# Patient Record
Sex: MALE | Race: ASIAN | Employment: OTHER | URBAN - METROPOLITAN AREA
[De-identification: names, ages, dates, MRNs, and addresses within clinical notes are randomized per-mention and may not be internally consistent; named-entity substitution may affect disease eponyms.]

---

## 2017-06-13 ENCOUNTER — TELEPHONE (OUTPATIENT)
Dept: INTERNAL MEDICINE CLINIC | Facility: CLINIC | Age: 47
End: 2017-06-13

## 2017-06-13 NOTE — TELEPHONE ENCOUNTER
Pt has appt for tomorrow for follow up but stated he has new symptoms of heart palpitations. Felt last night and about week before. Heart rate felt faster.  Pressed on chest wall to try to calm heart, then felt chest pain, did not have chest pain before doi

## 2017-06-13 NOTE — TELEPHONE ENCOUNTER
Noted below. Okay for visit tomorrow unless symptoms worsen or he has chest pain or shortness of breath. In that case go to the ER.

## 2017-06-14 ENCOUNTER — OFFICE VISIT (OUTPATIENT)
Dept: INTERNAL MEDICINE CLINIC | Facility: CLINIC | Age: 47
End: 2017-06-14

## 2017-06-14 VITALS
HEART RATE: 76 BPM | TEMPERATURE: 98 F | HEIGHT: 65 IN | BODY MASS INDEX: 29.09 KG/M2 | RESPIRATION RATE: 12 BRPM | SYSTOLIC BLOOD PRESSURE: 116 MMHG | DIASTOLIC BLOOD PRESSURE: 70 MMHG | WEIGHT: 174.63 LBS

## 2017-06-14 DIAGNOSIS — R00.2 PALPITATIONS: Primary | ICD-10-CM

## 2017-06-14 PROCEDURE — 99214 OFFICE O/P EST MOD 30 MIN: CPT | Performed by: INTERNAL MEDICINE

## 2017-06-14 PROCEDURE — 93000 ELECTROCARDIOGRAM COMPLETE: CPT | Performed by: INTERNAL MEDICINE

## 2017-06-14 NOTE — PROGRESS NOTES
[facility]    CHIEF COMPLAINT:  Patient presents with:  Palpitations        HISTORY OF PRESENT ILLNESS:  Complains of palpitations off and on for a month. Feels like his heart is racing or is irregular and skipping a beat.  Usually occurs during t <4.97   Non HDL Chol 152 (H) <130 mg/dL   -HEMOGLOBIN A1C   Result Value Ref Range   HgbA1C 5.6 <5.7 %   Estimated Average Glucose 114  mg/dL   -COMP METABOLIC PANEL (14)   Result Value Ref Range   Glucose 97 70-99 mg/dL   BUN 10 8-20 mg/dL   Creatin

## 2017-06-16 DIAGNOSIS — R79.89 LOW TSH LEVEL: Primary | ICD-10-CM

## 2017-08-02 ENCOUNTER — TELEPHONE (OUTPATIENT)
Dept: INTERNAL MEDICINE CLINIC | Facility: CLINIC | Age: 47
End: 2017-08-02

## 2017-08-02 NOTE — TELEPHONE ENCOUNTER
Called [facility], spoke to Plons. All providers are booking into October. He can see Dr Lawyer Granado in [place] in August.  Pt aware. Pt to call to reschedule.

## 2017-08-02 NOTE — TELEPHONE ENCOUNTER
Spoke with pt. States that his appt with Dr. Hawk Graves is in October and OV with VICKEY Prescott is 9/18/17. Pt asking if it is OK to wait or if Dr. Derrick Rivas can recommend other Cardio and Endocrine. Please advise.

## 2017-08-02 NOTE — TELEPHONE ENCOUNTER
Patient cannot get in to Dr. Noemi Kim till Oct. And can't get into Dr. Raza Montalvo till 9/18. Is it ok to wait, or is there another recommendation for him? Also patient is requesting that we fax his most recent labs to him [phone].

## 2017-08-10 PROCEDURE — 83520 IMMUNOASSAY QUANT NOS NONAB: CPT | Performed by: INTERNAL MEDICINE

## 2017-08-10 PROCEDURE — 84445 ASSAY OF TSI GLOBULIN: CPT | Performed by: INTERNAL MEDICINE

## 2017-08-10 PROCEDURE — 84480 ASSAY TRIIODOTHYRONINE (T3): CPT | Performed by: INTERNAL MEDICINE

## 2017-10-09 ENCOUNTER — TELEPHONE (OUTPATIENT)
Dept: INTERNAL MEDICINE CLINIC | Facility: CLINIC | Age: 47
End: 2017-10-09

## 2017-10-09 NOTE — TELEPHONE ENCOUNTER
Pt called requesting orders for Card Echo 2D Doppler and Card Monitor Holter 48 Hour.  Faxed to pt private fax #[phone]

## 2017-10-09 NOTE — TELEPHONE ENCOUNTER
[facility] dated [facility] to present. Forwarded to Scan Stat 10-10-17.   See med rec scan dated 10-9-17

## 2017-10-09 NOTE — TELEPHONE ENCOUNTER
Incoming (mail or fax):  fax  Received from:  [id number],[address] [place] [address]  Documentation given to:  Sebastian Alanis

## 2017-10-10 ENCOUNTER — TELEPHONE (OUTPATIENT)
Dept: INTERNAL MEDICINE CLINIC | Facility: CLINIC | Age: 47
End: 2017-10-10

## 2017-10-10 NOTE — TELEPHONE ENCOUNTER
If he is still having the palpitations my recommendation would be to have the holter done as that is really the only way to evaluate this. I am not aware of any locations though where it is offered at a cheaper price. Sorry.

## 2017-10-10 NOTE — TELEPHONE ENCOUNTER
Spoke with pt, he is feeling same on metoprolol, no noticeable changes to his palpitations. He is scheduled later this week for Thyroid Uptake and Scan and is having 2d echo today.  Pt asking if we know of location that does holter for less or if absolutely

## 2017-10-10 NOTE — TELEPHONE ENCOUNTER
Spoke with pt, advised since still having palpitations, still recommended he have the holter monitor done to evaluate this, but he would need to call around for pricing. Pt stated understanding.

## 2017-10-10 NOTE — TELEPHONE ENCOUNTER
Patient called saying that Dr. Jacob De León ordered some testing done for his heart. Patient stated that he was ordered a ECHO 2D and halter monitor. Patient was told by [facility] that these tests were not covered by his insurance.  Patient said that he fou

## 2017-10-10 NOTE — TELEPHONE ENCOUNTER
He is now on metoprolol per endocrine for his palpitations. How is he feeling on this? Are his palpitations better or does he continue to have these?

## 2017-10-10 NOTE — TELEPHONE ENCOUNTER
See below, do you have recommendation for a place that will do holter for less money? He wants to know how necessary tests are as they are not covered by insurance.

## 2017-10-25 NOTE — TELEPHONE ENCOUNTER
Incoming (mail or fax):  fax  Received from:  Kj Hernandez. request  Documentation given to:  [employer] records bin

## 2017-11-22 ENCOUNTER — TELEPHONE (OUTPATIENT)
Dept: INTERNAL MEDICINE CLINIC | Facility: CLINIC | Age: 47
End: 2017-11-22

## 2017-11-22 NOTE — TELEPHONE ENCOUNTER
Incoming (mail or fax): Fax  Received from:  Patient delivered in office. Documentation given to:  Triage- in Dr. Vera Harrington test results folder.

## 2017-11-22 NOTE — TELEPHONE ENCOUNTER
Pt dropped off faxed documents from [facility] re: thyroid uptake test and US of thyroid. Documents from [address] [place] [address] included @d doppler and 24 hour monitor results. To consult folder.

## 2017-11-22 NOTE — TELEPHONE ENCOUNTER
Reviewed results. Thyroid uptake and thyroid us ordered by endocrine. Echo was normal.   holter showed essentially sinus rhythm but there was one run of 9 beats where the rhythm was irregular. Unsure what this rhythm is.    Would recommend he see cardio

## 2017-11-28 NOTE — TELEPHONE ENCOUNTER
Patient made an appt with Dr Lorene Morley for December 13. He was not able to get an appt with Dr Jes Kennedy soon. Is this ok?

## 2017-12-08 PROBLEM — R00.2 PALPITATION: Status: ACTIVE | Noted: 2017-12-08

## 2018-08-20 ENCOUNTER — OFFICE VISIT (OUTPATIENT)
Dept: INTERNAL MEDICINE CLINIC | Facility: CLINIC | Age: 48
End: 2018-08-20
Payer: COMMERCIAL

## 2018-08-20 VITALS
HEART RATE: 72 BPM | DIASTOLIC BLOOD PRESSURE: 60 MMHG | SYSTOLIC BLOOD PRESSURE: 110 MMHG | TEMPERATURE: 98 F | WEIGHT: 173.19 LBS | RESPIRATION RATE: 12 BRPM | HEIGHT: 65 IN | BODY MASS INDEX: 28.86 KG/M2

## 2018-08-20 DIAGNOSIS — R00.2 PALPITATION: ICD-10-CM

## 2018-08-20 DIAGNOSIS — Z12.5 SCREENING FOR PROSTATE CANCER: ICD-10-CM

## 2018-08-20 DIAGNOSIS — N28.1 BILATERAL RENAL CYSTS: ICD-10-CM

## 2018-08-20 DIAGNOSIS — Z00.00 PHYSICAL EXAM, ANNUAL: Primary | ICD-10-CM

## 2018-08-20 PROCEDURE — 99396 PREV VISIT EST AGE 40-64: CPT | Performed by: INTERNAL MEDICINE

## 2018-08-20 NOTE — PROGRESS NOTES
[facility]    CHIEF COMPLAINT: Patient presents with:  Routine Physical         HPI:   Jhoana Peterson is a 49year old male who presents for a complete physical exam.      Palpitations:  Seeing cardiology. Is on diltiazem er.  He still gets occasio transplant  2/15: OTHER SURGICAL HISTORY      Comment: lithotripsy  12/15: OTHER SURGICAL HISTORY      Comment: sinus surgery  10/15/15: UPPER GI ENDOSCOPY PERFORMED      Comment: antral gastritis  2011: VASECTOMY   Family History   Problem Relation Age of apparent distress  SKIN: no rashes,no suspicious lesions  HEENT: oropharynx with cobblestoning and post nasal drip. No erythema.    EYES:PERRLA, conjunctiva are clear  NECK: supple,no adenopathy,  CHEST: no chest tenderness  LUNGS: clear to auscultation  CA PANEL (14)  - LIPID PANEL    2. Bilateral renal cysts  Will check ultrasound. - US KIDNEYS ([id number]=[id number]); Future    3. Screening for prostate cancer  - PSA    4. Palpitation  follow up with cardiology as planned.      The patient is asked to return in 1 yea

## 2018-08-25 LAB
ABSOLUTE BASOPHILS: 41 CELLS/UL (ref 0–200)
ABSOLUTE EOSINOPHILS: 78 CELLS/UL (ref 15–500)
ABSOLUTE LYMPHOCYTES: 1219 CELLS/UL (ref 850–3900)
ABSOLUTE MONOCYTES: 396 CELLS/UL (ref 200–950)
ABSOLUTE NEUTROPHILS: 2866 CELLS/UL (ref 1500–7800)
ALBUMIN/GLOBULIN RATIO: 1.9 (CALC) (ref 1–2.5)
ALBUMIN: 4.5 G/DL (ref 3.6–5.1)
ALKALINE PHOSPHATASE: 77 U/L (ref 40–115)
ALT: 35 U/L (ref 9–46)
AST: 29 U/L (ref 10–40)
BASOPHILS: 0.9 %
BILIRUBIN, TOTAL: 1 MG/DL (ref 0.2–1.2)
BUN: 12 MG/DL (ref 7–25)
CALCIUM: 9.8 MG/DL (ref 8.6–10.3)
CARBON DIOXIDE: 28 MMOL/L (ref 20–32)
CHLORIDE: 103 MMOL/L (ref 98–110)
CHOL/HDLC RATIO: 4.2 (CALC)
CHOLESTEROL, TOTAL: 186 MG/DL
CREATININE: 0.96 MG/DL (ref 0.6–1.35)
EGFR IF AFRICN AM: 108 ML/MIN/1.73M2
EGFR IF NONAFRICN AM: 93 ML/MIN/1.73M2
EOSINOPHILS: 1.7 %
GLOBULIN: 2.4 G/DL (CALC) (ref 1.9–3.7)
GLUCOSE: 93 MG/DL (ref 65–99)
HDL CHOLESTEROL: 44 MG/DL
HEMATOCRIT: 41.8 % (ref 38.5–50)
HEMOGLOBIN: 14.5 G/DL (ref 13.2–17.1)
LDL-CHOLESTEROL: 123 MG/DL (CALC)
LYMPHOCYTES: 26.5 %
MCH: 30.9 PG (ref 27–33)
MCHC: 34.7 G/DL (ref 32–36)
MCV: 88.9 FL (ref 80–100)
MONOCYTES: 8.6 %
MPV: 11 FL (ref 7.5–12.5)
NEUTROPHILS: 62.3 %
NON-HDL CHOLESTEROL: 142 MG/DL (CALC)
PLATELET COUNT: 173 THOUSAND/UL (ref 140–400)
POTASSIUM: 4.2 MMOL/L (ref 3.5–5.3)
PROTEIN, TOTAL: 6.9 G/DL (ref 6.1–8.1)
PSA, TOTAL: 0.5 NG/ML
RDW: 12.8 % (ref 11–15)
RED BLOOD CELL COUNT: 4.7 MILLION/UL (ref 4.2–5.8)
SODIUM: 138 MMOL/L (ref 135–146)
TRIGLYCERIDES: 89 MG/DL
WHITE BLOOD CELL COUNT: 4.6 THOUSAND/UL (ref 3.8–10.8)

## 2018-08-31 ENCOUNTER — TELEPHONE (OUTPATIENT)
Dept: INTERNAL MEDICINE CLINIC | Facility: CLINIC | Age: 48
End: 2018-08-31

## 2018-08-31 NOTE — TELEPHONE ENCOUNTER
Incoming (mail or fax):  fax  Received from:  [facility]  Documentation given to:  triage

## 2018-08-31 NOTE — TELEPHONE ENCOUNTER
Called the patient and spoke with his wife, Meme Bull, who is on HIPAA. Informed her that the [address] of his kidneys showed only a small right renal cyst, so Dr. Dilcia Lino recommends he stay plenty hydrated and drink a lot of water.  The patient's wife verbalized unde

## 2018-08-31 NOTE — TELEPHONE ENCOUNTER
Ultrasound shows only a small right renal cyst. Maybe a small non obstructing kidney stone. Stay well hydrated.

## 2019-11-07 ENCOUNTER — OFFICE VISIT (OUTPATIENT)
Dept: INTERNAL MEDICINE CLINIC | Facility: CLINIC | Age: 49
End: 2019-11-07
Payer: COMMERCIAL

## 2019-11-07 VITALS
TEMPERATURE: 98 F | RESPIRATION RATE: 12 BRPM | BODY MASS INDEX: 29.72 KG/M2 | HEIGHT: 65 IN | WEIGHT: 178.38 LBS | SYSTOLIC BLOOD PRESSURE: 120 MMHG | DIASTOLIC BLOOD PRESSURE: 78 MMHG | HEART RATE: 72 BPM

## 2019-11-07 DIAGNOSIS — Z12.5 SCREENING FOR PROSTATE CANCER: ICD-10-CM

## 2019-11-07 DIAGNOSIS — Z00.00 PHYSICAL EXAM, ANNUAL: Primary | ICD-10-CM

## 2019-11-07 DIAGNOSIS — Z12.11 SCREENING FOR COLON CANCER: ICD-10-CM

## 2019-11-07 PROCEDURE — 99396 PREV VISIT EST AGE 40-64: CPT | Performed by: INTERNAL MEDICINE

## 2019-11-07 NOTE — PROGRESS NOTES
[facility]    CHIEF COMPLAINT: Patient presents with:  Routine Physical  Imm/Inj: declines flu shot.  Will have done at [facility].          HPI:   Dallas Collier is a 47year old male who presents for a complete physical exam.     Seeing cardiology fo Family History   Problem Relation Age of Onset   • Other (MI,) Paternal Grandmother         69's   • Other (CAD, s/p MI) Paternal Grandfather    • Other (HTN,anemia) Maternal Grandmother    • Other (knee pain) Father    • Other (HTN,HLD,DM) Mother    • O tenderness  GENITAL/URINARY:  normal testicular exam, no inguinal hernia present, prostate with no masses or tenderness  MUSCULOSKELETAL: back is not tender,FROM of the back  EXTREMITIES: no cyanosis, clubbing or edema  NEURO: Oriented times three,cranial 11/7/2020) for physical.      Khalida Dewey MD

## 2019-11-11 DIAGNOSIS — R79.89 LOW TSH LEVEL: Primary | ICD-10-CM

## 2019-11-14 NOTE — PROGRESS NOTES
Spoke to pt. Stated had Thyroid US done 2 years ago. Noted scan 11/10/17 in Media tab for Thyroid Ultrasound by Dr. Taylor Orr, endocrinologist.  Noted used to be on Methimazole 5mg daily.   To Dr. Nicole Carpenter for review, see 11/10/17 scan & Cicely's note bel

## 2019-11-15 DIAGNOSIS — E05.90 HYPERTHYROIDISM: Primary | ICD-10-CM

## 2020-04-29 ENCOUNTER — TELEPHONE (OUTPATIENT)
Dept: INTERNAL MEDICINE CLINIC | Facility: CLINIC | Age: 50
End: 2020-04-29

## 2020-05-19 ENCOUNTER — TELEPHONE (OUTPATIENT)
Dept: INTERNAL MEDICINE CLINIC | Facility: CLINIC | Age: 50
End: 2020-05-19

## 2020-05-19 NOTE — TELEPHONE ENCOUNTER
Incoming (mail or fax): fax  Received from:  [facility]  Documentation given to:  triage

## 2020-05-20 RX ORDER — ATORVASTATIN CALCIUM 20 MG/1
20 TABLET, FILM COATED ORAL DAILY
COMMUNITY
Start: 2020-04-30

## 2020-05-26 NOTE — TELEPHONE ENCOUNTER
1 polyp 5-6mm. Path report shows tubular adenoma. Per uptodate pt needs repeat colonoscopy in 7-10 years.

## 2020-06-24 ENCOUNTER — MED REC SCAN ONLY (OUTPATIENT)
Dept: INTERNAL MEDICINE CLINIC | Facility: CLINIC | Age: 50
End: 2020-06-24

## 2020-07-29 LAB — AMB EXT COVID-19 RESULT: DETECTED

## 2020-08-04 ENCOUNTER — TELEPHONE (OUTPATIENT)
Dept: INTERNAL MEDICINE CLINIC | Facility: CLINIC | Age: 50
End: 2020-08-04

## 2020-08-04 NOTE — TELEPHONE ENCOUNTER
Pt tested positive for COVID last week and his family member is a doctor and recommended he get a CXR. Does he need a virtual visit?

## 2020-08-04 NOTE — TELEPHONE ENCOUNTER
Pt tested Positive for COVID last week and Pt would like to get a chest Xray ordered.   Please advise  Thank you

## 2020-08-05 ENCOUNTER — HOSPITAL ENCOUNTER (OUTPATIENT)
Dept: GENERAL RADIOLOGY | Facility: HOSPITAL | Age: 50
Discharge: HOME | End: 2020-08-05
Attending: INTERNAL MEDICINE
Payer: COMMERCIAL

## 2020-08-05 ENCOUNTER — TELEMEDICINE (OUTPATIENT)
Dept: INTERNAL MEDICINE CLINIC | Facility: CLINIC | Age: 50
End: 2020-08-05

## 2020-08-05 ENCOUNTER — TELEPHONE (OUTPATIENT)
Dept: INTERNAL MEDICINE CLINIC | Facility: CLINIC | Age: 50
End: 2020-08-05

## 2020-08-05 DIAGNOSIS — R06.02 SHORTNESS OF BREATH: ICD-10-CM

## 2020-08-05 DIAGNOSIS — U07.1 REAL TIME REVERSE TRANSCRIPTASE PCR POSITIVE FOR COVID-19 VIRUS: ICD-10-CM

## 2020-08-05 DIAGNOSIS — J18.9 PNEUMONIA OF RIGHT UPPER LOBE DUE TO INFECTIOUS ORGANISM: Primary | ICD-10-CM

## 2020-08-05 DIAGNOSIS — U07.1 REAL TIME REVERSE TRANSCRIPTASE PCR POSITIVE FOR COVID-19 VIRUS: Primary | ICD-10-CM

## 2020-08-05 PROCEDURE — 71046 X-RAY EXAM CHEST 2 VIEWS: CPT | Performed by: INTERNAL MEDICINE

## 2020-08-05 PROCEDURE — 99213 OFFICE O/P EST LOW 20 MIN: CPT | Performed by: INTERNAL MEDICINE

## 2020-08-05 RX ORDER — AZITHROMYCIN 250 MG/1
TABLET, FILM COATED ORAL
Qty: 6 TABLET | Refills: 0 | Status: SHIPPED | OUTPATIENT
Start: 2020-08-05 | End: 2020-08-10

## 2020-08-05 RX ORDER — AMOXICILLIN AND CLAVULANATE POTASSIUM 875; 125 MG/1; MG/1
1 TABLET, FILM COATED ORAL 2 TIMES DAILY
Qty: 20 TABLET | Refills: 0 | Status: SHIPPED | OUTPATIENT
Start: 2020-08-05 | End: 2020-11-13

## 2020-08-05 NOTE — TELEPHONE ENCOUNTER
Virtual/Telephone Consent    Toshia Cerda verbally consents to a Virtual/Telephone Check-In service on 8/5/2020. Patient understands and accepts financial responsibility for any deductible, co-insurance and/or co-pays associated with this service.     Swapna

## 2020-08-05 NOTE — PROGRESS NOTES
Doximity Video Visit Consent    Florecita Cedeno verbally consents to a Doximity Video Visit Check-In service on 8/5/2020. Patient understands that we are using a different platform that may not be as secure as our traditional telehealth platform.  Patient wa your physician. (Patient not taking: Reported on 1/2/2020 ) 1 Bottle 0   • Multiple Vitamins-Minerals (CENTRUM OR) Take by mouth daily.          PAST MEDICAL, SOCIAL, AND FAMILY HISTORY:  Tobacco:      Smoking status: Former Smoker 0.00 Packs/day       PHYS this visit as no physical exam could be performed. Every conscious effort was taken to allow for sufficient and adequate time. This billing was spent on reviewing labs, medications, radiology tests and decision making.   Appropriate medical decision-alinein

## 2020-08-06 NOTE — TELEPHONE ENCOUNTER
The patient was called to check on his condition. The patient states that he is doing better and today he has no symptoms. He feels like normal today. He is taking the abt as prescribed.  He was instructed to continue to monitor his symptoms and go to ER fo

## 2020-08-07 ENCOUNTER — TELEPHONE (OUTPATIENT)
Dept: INTERNAL MEDICINE CLINIC | Facility: CLINIC | Age: 50
End: 2020-08-07

## 2020-08-07 NOTE — TELEPHONE ENCOUNTER
The patient was called to check on his condition. He states he is doing okay. Yesterday he felt good. Today he also felt good until a little while ago he felt mildly sob after sitting in his office for a couple of hours.  It resolved after getting up and mo

## 2020-08-10 ENCOUNTER — TELEPHONE (OUTPATIENT)
Dept: INTERNAL MEDICINE CLINIC | Facility: CLINIC | Age: 50
End: 2020-08-10

## 2020-08-10 NOTE — TELEPHONE ENCOUNTER
The patient states he is feeling better. He has not had symptoms at all in the last 3 days. He denies shortness of breath. He completed the zpack and is still taking the augmentin. He was tested positive for COVID on 7/29.  It has been 12 days, He was infor

## 2020-08-18 ENCOUNTER — TELEPHONE (OUTPATIENT)
Dept: INTERNAL MEDICINE CLINIC | Facility: CLINIC | Age: 50
End: 2020-08-18

## 2020-08-18 DIAGNOSIS — R93.89 ABNORMAL CHEST X-RAY: Primary | ICD-10-CM

## 2020-09-02 ENCOUNTER — HOSPITAL ENCOUNTER (OUTPATIENT)
Dept: GENERAL RADIOLOGY | Facility: HOSPITAL | Age: 50
Discharge: HOME | End: 2020-09-02
Attending: INTERNAL MEDICINE
Payer: COMMERCIAL

## 2020-09-02 DIAGNOSIS — R93.89 ABNORMAL CHEST X-RAY: ICD-10-CM

## 2020-09-02 PROCEDURE — 71046 X-RAY EXAM CHEST 2 VIEWS: CPT | Performed by: INTERNAL MEDICINE

## 2020-11-13 ENCOUNTER — OFFICE VISIT (OUTPATIENT)
Dept: INTERNAL MEDICINE CLINIC | Facility: CLINIC | Age: 50
End: 2020-11-13
Payer: COMMERCIAL

## 2020-11-13 VITALS
SYSTOLIC BLOOD PRESSURE: 112 MMHG | RESPIRATION RATE: 12 BRPM | TEMPERATURE: 97 F | BODY MASS INDEX: 28.27 KG/M2 | WEIGHT: 169.69 LBS | HEIGHT: 65 IN | HEART RATE: 64 BPM | DIASTOLIC BLOOD PRESSURE: 68 MMHG

## 2020-11-13 DIAGNOSIS — Z12.5 SCREENING FOR PROSTATE CANCER: ICD-10-CM

## 2020-11-13 DIAGNOSIS — Z00.00 PHYSICAL EXAM, ANNUAL: Primary | ICD-10-CM

## 2020-11-13 DIAGNOSIS — R00.2 PALPITATIONS: ICD-10-CM

## 2020-11-13 PROCEDURE — 3074F SYST BP LT 130 MM HG: CPT | Performed by: INTERNAL MEDICINE

## 2020-11-13 PROCEDURE — 99396 PREV VISIT EST AGE 40-64: CPT | Performed by: INTERNAL MEDICINE

## 2020-11-13 PROCEDURE — 3008F BODY MASS INDEX DOCD: CPT | Performed by: INTERNAL MEDICINE

## 2020-11-13 PROCEDURE — 3078F DIAST BP <80 MM HG: CPT | Performed by: INTERNAL MEDICINE

## 2020-11-13 NOTE — PROGRESS NOTES
[facility]    CHIEF COMPLAINT: Patient presents with:  Routine Physical: 5/15/20-colon-repeat 7-10 years         HPI:   Riley Odell is a 45year old male who presents for a complete physical exam.    history of palpitations.  He sees cardiology bilat   • Overweight(278.02)    • PAC (premature atrial contraction)    • Thyroid dysfunction       Past Surgical History:   Procedure Laterality Date   • OTHER SURGICAL HISTORY  1991    hair transplant   • OTHER SURGICAL HISTORY  2/15    lithotripsy index is 28.24 kg/m².    GENERAL: well developed, well nourished,in no apparent distress  SKIN: no rashes,no suspicious lesions  HEENT: atraumatic, normocephalic,ears and throat are clear  EYES:PERRLA, conjunctiva are clear  NECK: supple,no adenopathy,no br PLATELET  - COMP METABOLIC PANEL (14)  - LIPID PANEL  - HEMOGLOBIN A1C  - TSH W REFLEX TO FREE T4    2. Palpitations  Urged to follow up with cardiology re palpitations. He verbalized understanding and will make an appt.      3. Screening for prostate can

## 2020-11-17 ENCOUNTER — TELEPHONE (OUTPATIENT)
Dept: INTERNAL MEDICINE CLINIC | Facility: CLINIC | Age: 50
End: 2020-11-17

## 2020-11-17 DIAGNOSIS — R00.2 PALPITATIONS: ICD-10-CM

## 2020-11-17 DIAGNOSIS — Z00.00 PHYSICAL EXAM, ANNUAL: Primary | ICD-10-CM

## 2020-11-17 NOTE — TELEPHONE ENCOUNTER
Pt would like to have a lab added on, they would like a vitamin D lab. Pt stated they would like this to be ready and sent to [facility] by tomorrow as they will be having their labs done then. Sending as high priority before pt goes to have labs done.

## 2020-11-17 NOTE — TELEPHONE ENCOUNTER
Spoke to pt. Made aware VIT D ordered & that insurance may not cover this test.  Pt voiced understanding & states will call insurance. Pt now also requesting VIT B12.   Pt states \"I've been doing research & my palpitations (noted discussed in 11/13/202

## 2020-11-17 NOTE — TELEPHONE ENCOUNTER
See note below  Okay to add on VIT D to labs pt will have done tomorrow at [facility]. Pending for approval    High priority d/t lab scheduled tomorrow morning.

## 2020-11-18 NOTE — TELEPHONE ENCOUNTER
Left detailed message per HIPAA. Advised that VIT D & VIT B12 were ordered. Also Dr. Freddy Horne added FOLIC ACID level. Reminded pt to call insurance to confirm if they are confirmed.   If those labs are not covered, it is up to pt to pay out of pocket or pt c

## 2020-12-04 DIAGNOSIS — E05.90 HYPERTHYROIDISM: Primary | ICD-10-CM

## 2020-12-07 ENCOUNTER — TELEPHONE (OUTPATIENT)
Dept: INTERNAL MEDICINE CLINIC | Facility: CLINIC | Age: 50
End: 2020-12-07

## 2020-12-07 DIAGNOSIS — E05.90 HYPERTHYROIDISM: Primary | ICD-10-CM

## 2020-12-07 DIAGNOSIS — R00.2 PALPITATION: ICD-10-CM

## 2020-12-07 NOTE — TELEPHONE ENCOUNTER
Pt has referral to Dr Tamika Garcia MD, and when Pt called office to make appt Pt was told that Dr Cyndi Gayle is leaving [facility].  Pt would like to get a recommendation and a referral for a new Endo .   Please advise  Thank you

## 2020-12-07 NOTE — TELEPHONE ENCOUNTER
He can see dr. Georgette Villarreal or dr. Jessica Mccray. If the wait to see them is too long would recommend he call dr. Corina Magdaleno office and find out who he can see sooner.

## 2020-12-08 NOTE — TELEPHONE ENCOUNTER
Dr. Luis Enamorado MD  [facility] [address] [facility]  [address] [place] [id number] [phone]    Dr. Jerad Catalan, [address] [place] [address] Endocrinology  [address] Killian, [address] [place] [address]  [phone]    Informed pt via

## 2020-12-09 NOTE — TELEPHONE ENCOUNTER
Patient did get an appointment for the middle of February with endocrinology. Is it acceptable to wait until then or should patient contact Dr. Chidi Gonzalez office to see if he can get in sooner?

## 2020-12-10 NOTE — TELEPHONE ENCOUNTER
Would recommend being seen sooner as he has some palpitations which could be due to the hyperthyroid. He can call dr. Echevarria Code office to see if they can get him in sooner.

## 2020-12-10 NOTE — TELEPHONE ENCOUNTER
lou sierra sent back to pt. New referral placed. Dr Jacquelin Smalls is also [facility] but at different office.

## 2021-04-12 ENCOUNTER — OFFICE VISIT (OUTPATIENT)
Dept: INTERNAL MEDICINE CLINIC | Facility: CLINIC | Age: 51
End: 2021-04-12
Payer: COMMERCIAL

## 2021-04-12 VITALS
BODY MASS INDEX: 28.99 KG/M2 | RESPIRATION RATE: 12 BRPM | HEART RATE: 72 BPM | SYSTOLIC BLOOD PRESSURE: 118 MMHG | TEMPERATURE: 98 F | WEIGHT: 174 LBS | HEIGHT: 65 IN | DIASTOLIC BLOOD PRESSURE: 70 MMHG

## 2021-04-12 DIAGNOSIS — H93.13 TINNITUS OF BOTH EARS: Primary | ICD-10-CM

## 2021-04-12 DIAGNOSIS — E05.90 HYPERTHYROIDISM: ICD-10-CM

## 2021-04-12 PROCEDURE — 99213 OFFICE O/P EST LOW 20 MIN: CPT | Performed by: INTERNAL MEDICINE

## 2021-04-12 PROCEDURE — 3078F DIAST BP <80 MM HG: CPT | Performed by: INTERNAL MEDICINE

## 2021-04-12 PROCEDURE — 3008F BODY MASS INDEX DOCD: CPT | Performed by: INTERNAL MEDICINE

## 2021-04-12 PROCEDURE — 3074F SYST BP LT 130 MM HG: CPT | Performed by: INTERNAL MEDICINE

## 2021-04-12 RX ORDER — OMEPRAZOLE 40 MG/1
40 CAPSULE, DELAYED RELEASE ORAL DAILY
COMMUNITY
End: 2021-09-17 | Stop reason: ALTCHOICE

## 2021-04-12 NOTE — PROGRESS NOTES
[facility]    CHIEF COMPLAINT:  Patient presents with:  Ear Problem: ringing in left ear. No problem hearing. No pain. 5/15/20-colon-repeat 10 years        HISTORY OF PRESENT ILLNESS:  Complains of ringing in the ears.    Has had a low level hummi INTERNAL    2. Hyperthyroidism  Reviewed endocrine notes with pt. He will resume methimazole. Return if symptoms worsen or fail to improve.       Zulema Shrestha MD

## 2021-05-11 ENCOUNTER — TELEPHONE (OUTPATIENT)
Dept: INTERNAL MEDICINE CLINIC | Facility: CLINIC | Age: 51
End: 2021-05-11

## 2021-05-11 DIAGNOSIS — H93.13 TINNITUS OF BOTH EARS: Primary | ICD-10-CM

## 2021-05-11 NOTE — TELEPHONE ENCOUNTER
Patient saw Dr Vane Hernández on April 12th for tinnitus of both ears and called with follow up questions. Patient refused to schedule an appointment at time of call. Please advise. Thank you!

## 2021-05-11 NOTE — TELEPHONE ENCOUNTER
Pt requesting an MRI of the bring with and with out contrast, per his uncle who is a MD in [facility] to r/o a brain tumor. Pt stated he has seen ENT, they cleaned out his ear and he is still experience the tinnitus. Please advise.

## 2021-05-11 NOTE — TELEPHONE ENCOUNTER
Pt would like his lab results to be sent in one page on [employer] please.  Lab results from December (flow sheet)    Please advise    Thank you

## 2021-05-11 NOTE — TELEPHONE ENCOUNTER
Called pt, pt stated he has already printed the labs from [facility]. No longer needs a copy sent.

## 2021-05-18 NOTE — TELEPHONE ENCOUNTER
I would recommend MRI of the internal auditory canals and not a brain MRI. Okay to order. Reason tinnitus.

## 2021-05-18 NOTE — TELEPHONE ENCOUNTER
Incoming (mail or fax):  fax  Received from:  Dr Bia Solorzano office  Documentation given to:  triage

## 2021-05-18 NOTE — TELEPHONE ENCOUNTER
Spoke to AMANDA ORTIZ [facility] at Dr. Dieudonne Saleh. Re-requested records.   Stated will fax now

## 2021-05-18 NOTE — TELEPHONE ENCOUNTER
Order placed for MRI IACS. Spoke to pt & informed of Dr. Casandra Cannon recommendation as noted below. Pt voiced understanding. Pt persisted request to add order for MRI BRAIN. Per verbal by beth Hwoard to order MRI [facility].   Cancelled other order & new o

## 2021-05-25 ENCOUNTER — MED REC SCAN ONLY (OUTPATIENT)
Dept: INTERNAL MEDICINE CLINIC | Facility: CLINIC | Age: 51
End: 2021-05-25

## 2021-06-07 ENCOUNTER — TELEPHONE (OUTPATIENT)
Dept: INTERNAL MEDICINE CLINIC | Facility: CLINIC | Age: 51
End: 2021-06-07

## 2021-06-07 DIAGNOSIS — H93.13 TINNITUS OF BOTH EARS: Primary | ICD-10-CM

## 2021-06-07 NOTE — TELEPHONE ENCOUNTER
Incoming (mail or fax):  fax  Received from:  [facility]   Documentation given to:   test result bin

## 2021-06-07 NOTE — TELEPHONE ENCOUNTER
Received MRI of brain and IAC with and without contrast completed on 6/3/21 at [facility]    Order was placed on 5/18/21    To Dr. Riley Shannon for review

## 2021-06-08 NOTE — TELEPHONE ENCOUNTER
Pt notified of results and recommendations  Patient was advised to see ENT  PT verbalized understanding  Below provider info was given to pt  Pt stated his health insurance has changed  Transferred call to front to update insurance info    Referral order p

## 2021-06-08 NOTE — TELEPHONE ENCOUNTER
Reviewed MRI brain and IAC. Negative brain and IAC mri. Shows some paranasal sinus disease. He should follow up with ent for this. He has seen ent before. Please inform pt. Sent for scanning.

## 2021-06-18 ENCOUNTER — MED REC SCAN ONLY (OUTPATIENT)
Dept: INTERNAL MEDICINE CLINIC | Facility: CLINIC | Age: 51
End: 2021-06-18

## 2021-12-07 ENCOUNTER — OFFICE VISIT (OUTPATIENT)
Dept: INTERNAL MEDICINE CLINIC | Facility: CLINIC | Age: 51
End: 2021-12-07
Payer: COMMERCIAL

## 2021-12-07 VITALS
DIASTOLIC BLOOD PRESSURE: 72 MMHG | BODY MASS INDEX: 29.85 KG/M2 | TEMPERATURE: 97 F | SYSTOLIC BLOOD PRESSURE: 120 MMHG | RESPIRATION RATE: 12 BRPM | WEIGHT: 177 LBS | HEIGHT: 64.5 IN | HEART RATE: 84 BPM

## 2021-12-07 DIAGNOSIS — Z12.5 SCREENING FOR PROSTATE CANCER: ICD-10-CM

## 2021-12-07 DIAGNOSIS — K62.5 RECTAL BLEEDING: ICD-10-CM

## 2021-12-07 DIAGNOSIS — Z00.00 PHYSICAL EXAM, ANNUAL: Primary | ICD-10-CM

## 2021-12-07 DIAGNOSIS — Z12.11 SCREENING FOR COLON CANCER: ICD-10-CM

## 2021-12-07 PROCEDURE — 3008F BODY MASS INDEX DOCD: CPT | Performed by: INTERNAL MEDICINE

## 2021-12-07 PROCEDURE — 3074F SYST BP LT 130 MM HG: CPT | Performed by: INTERNAL MEDICINE

## 2021-12-07 PROCEDURE — 3078F DIAST BP <80 MM HG: CPT | Performed by: INTERNAL MEDICINE

## 2021-12-07 PROCEDURE — 99396 PREV VISIT EST AGE 40-64: CPT | Performed by: INTERNAL MEDICINE

## 2021-12-07 RX ORDER — MELATONIN
1000 DAILY
COMMUNITY

## 2021-12-07 NOTE — PROGRESS NOTES
[facility]    CHIEF COMPLAINT: Patient presents with:  Routine Physical: 5/15/20-colon-repeat 10 years  Immunization/Injection: declines flu shot.  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

## 2021-12-13 ENCOUNTER — PATIENT MESSAGE (OUTPATIENT)
Dept: INTERNAL MEDICINE CLINIC | Facility: CLINIC | Age: 51
End: 2021-12-13

## 2021-12-13 PROBLEM — E05.90 HYPERTHYROIDISM: Status: ACTIVE | Noted: 2021-12-13

## 2022-01-17 ENCOUNTER — MED REC SCAN ONLY (OUTPATIENT)
Dept: INTERNAL MEDICINE CLINIC | Facility: CLINIC | Age: 52
End: 2022-01-17

## 2022-02-02 ENCOUNTER — MED REC SCAN ONLY (OUTPATIENT)
Dept: INTERNAL MEDICINE CLINIC | Facility: CLINIC | Age: 52
End: 2022-02-02

## 2023-01-09 ENCOUNTER — OFFICE VISIT (OUTPATIENT)
Dept: INTERNAL MEDICINE CLINIC | Facility: CLINIC | Age: 53
End: 2023-01-09
Payer: COMMERCIAL

## 2023-01-09 VITALS
HEART RATE: 64 BPM | RESPIRATION RATE: 16 BRPM | HEIGHT: 64.5 IN | BODY MASS INDEX: 29.7 KG/M2 | DIASTOLIC BLOOD PRESSURE: 72 MMHG | SYSTOLIC BLOOD PRESSURE: 116 MMHG | WEIGHT: 176.13 LBS | TEMPERATURE: 98 F

## 2023-01-09 DIAGNOSIS — Z00.00 PHYSICAL EXAM, ANNUAL: Primary | ICD-10-CM

## 2023-01-09 DIAGNOSIS — Z23 NEED FOR VACCINATION: ICD-10-CM

## 2023-01-09 DIAGNOSIS — Z12.5 SCREENING FOR PROSTATE CANCER: ICD-10-CM

## 2023-01-09 PROCEDURE — 99396 PREV VISIT EST AGE 40-64: CPT | Performed by: INTERNAL MEDICINE

## 2023-01-09 PROCEDURE — 3074F SYST BP LT 130 MM HG: CPT | Performed by: INTERNAL MEDICINE

## 2023-01-09 PROCEDURE — 3008F BODY MASS INDEX DOCD: CPT | Performed by: INTERNAL MEDICINE

## 2023-01-09 PROCEDURE — 3078F DIAST BP <80 MM HG: CPT | Performed by: INTERNAL MEDICINE

## 2023-01-09 PROCEDURE — 90471 IMMUNIZATION ADMIN: CPT | Performed by: INTERNAL MEDICINE

## 2023-01-09 PROCEDURE — 90715 TDAP VACCINE 7 YRS/> IM: CPT | Performed by: INTERNAL MEDICINE

## 2023-01-19 DIAGNOSIS — E05.90 HYPERTHYROIDISM: Primary | ICD-10-CM

## 2023-01-19 LAB
ABSOLUTE BASOPHILS: 32 CELLS/UL (ref 0–200)
ABSOLUTE EOSINOPHILS: 243 CELLS/UL (ref 15–500)
ABSOLUTE LYMPHOCYTES: 1040 CELLS/UL (ref 850–3900)
ABSOLUTE MONOCYTES: 342 CELLS/UL (ref 200–950)
ABSOLUTE NEUTROPHILS: 2844 CELLS/UL (ref 1500–7800)
ALBUMIN/GLOBULIN RATIO: 1.8 (CALC) (ref 1–2.5)
ALBUMIN: 4.4 G/DL (ref 3.6–5.1)
ALKALINE PHOSPHATASE: 68 U/L (ref 35–144)
ALT: 38 U/L (ref 9–46)
AST: 34 U/L (ref 10–35)
BASOPHILS: 0.7 %
BILIRUBIN, TOTAL: 0.9 MG/DL (ref 0.2–1.2)
BUN: 9 MG/DL (ref 7–25)
CALCIUM: 9.7 MG/DL (ref 8.6–10.3)
CARBON DIOXIDE: 29 MMOL/L (ref 20–32)
CHLORIDE: 104 MMOL/L (ref 98–110)
CHOL/HDLC RATIO: 3 (CALC)
CHOLESTEROL, TOTAL: 130 MG/DL
CREATININE: 0.91 MG/DL (ref 0.7–1.3)
EGFR: 101 ML/MIN/1.73M2
EOSINOPHILS: 5.4 %
GLOBULIN: 2.4 G/DL (CALC) (ref 1.9–3.7)
GLUCOSE: 118 MG/DL (ref 65–99)
HDL CHOLESTEROL: 44 MG/DL
HEMATOCRIT: 41.9 % (ref 38.5–50)
HEMOGLOBIN A1C: 6 % OF TOTAL HGB
HEMOGLOBIN: 14.8 G/DL (ref 13.2–17.1)
LDL-CHOLESTEROL: 70 MG/DL (CALC)
LYMPHOCYTES: 23.1 %
MCH: 30.8 PG (ref 27–33)
MCHC: 35.3 G/DL (ref 32–36)
MCV: 87.3 FL (ref 80–100)
MONOCYTES: 7.6 %
MPV: 11.5 FL (ref 7.5–12.5)
NEUTROPHILS: 63.2 %
NON-HDL CHOLESTEROL: 86 MG/DL (CALC)
PLATELET COUNT: 143 THOUSAND/UL (ref 140–400)
POTASSIUM: 4.9 MMOL/L (ref 3.5–5.3)
PROTEIN, TOTAL: 6.8 G/DL (ref 6.1–8.1)
PSA, TOTAL: 0.79 NG/ML
RDW: 13.6 % (ref 11–15)
RED BLOOD CELL COUNT: 4.8 MILLION/UL (ref 4.2–5.8)
SODIUM: 138 MMOL/L (ref 135–146)
T4, FREE: 1.2 NG/DL (ref 0.8–1.8)
TRIGLYCERIDES: 75 MG/DL
TSH W/REFLEX TO FT4: 0.29 MIU/L (ref 0.4–4.5)
WHITE BLOOD CELL COUNT: 4.5 THOUSAND/UL (ref 3.8–10.8)

## 2023-10-10 ENCOUNTER — HOSPITAL ENCOUNTER (OUTPATIENT)
Dept: CT IMAGING | Age: 53
Discharge: HOME | End: 2023-10-10
Attending: INTERNAL MEDICINE

## 2023-10-10 DIAGNOSIS — Z13.6 SCREENING FOR HEART DISEASE: ICD-10-CM

## 2023-10-10 LAB
POCT GLUCOSE CHOLESTECH: 114 (ref 70–99)
POCT HDL: 34 (ref 45–60)
POCT LDL: 0 (ref 0–99)
POCT TOTAL CHOLESTEROL: 103 (ref 110–200)
POCT TRIGLYCERIDES: 45 (ref 1–149)

## 2023-10-10 NOTE — PROGRESS NOTES
Date of Service 10/10/2023    Severiano Sykes  Date of Birth 10/30/1970    Patient Age: 47year old    PCP: Twyla Lewis MD  [address] [address]    Heart Scan Consult  Preliminary Heart Scan Score: 74.7    Previous Screening  Heart Scan Completed Previously: Yes  Year of last heart scan: 2014  Score of last heart scan: 0.0  Peripheral Vascular Scan Completed Previously: Yes  Year of last PV screenin2014  Score of last PV screening: Carotids, Abdomen and MUNIR's all Normal.    Risk Factors  Personal Risk Factors  Alterable Risk Factors: High Blood Pressure; Abnormal Cholesterol;Pre-Diabetes      Body Mass Index  There is no height or weight on file to calculate BMI. BMI 29    Blood Pressure  There were no vitals taken for this visit. /72  he is on med. (Normal =< 120/80,  Elevated = 120-129/ >80,  High Stage1 130-139/80-89 , Stage2 >140/>90)    Lipid Profile  Patient was in fasting state: Yes    Cholesterol: 103, done on 10/10/2023. HDL Cholesterol: 34, done on 10/10/2023. LDL Cholesterol: 0, done on 10/10/2023. TriGlycerides 45, done on 10/10/2023. He is on medication. Cholesterol Goals  Value   Total  =< 200   HDL  = > 45 Men = > 55 Women   LDL   =< 100   Triglycerides  =< 150       Glucose and Hemoglobin A1C  Pre diabetes - not on any medication. Lab Results   Component Value Date     (H) 2023    A1C 6.0 (H) 2023     (Normal Fasting Glucose < 100mg/dl )    Nurse Review  Risk factor information and results reviewed with Nurse: Yes    Recommended Follow Up:  Consult your physician regarding[de-identified] Final Heart Scan Report; Discuss potential for Incidental Finding      Recommendations for Change:  Nutrition Changes: Low Saturated Fat    Cholesterol Modification (goal of therapy depends upon your risk):  Increase HDL (Healthy/Good) Normal >45 Men >55 Women    Exercise: Enhance Current Program    Smoking Cessation: No Change Needed    Weight Management: Decrease Current Weight    Stress Management: Adopt Stress Management Techniques    Repeat Heart Scan: 3 Years if Calcium Score is > 0. 0; Discuss with your Physician    Repeat PV Screening: 3 Years         Samir Recommended Resources:  Recommended Resources: PV Screening;Upcoming Classes, Medical Services and Health Library [email or web address]  Recommended PV Screening: Abdomen; Ankle-Brachial Index (MUNIR); Carotids         Marquis Malia RN        Please Contact the Nurse Heart Line with any Questions or Concerns [phone].

## 2024-02-25 NOTE — PROGRESS NOTES
[facility]    CHIEF COMPLAINT:   Chief Complaint   Patient presents with    Physical     //Physical; 1/26/22-colon-repeat 7 years PSA 1/19/2023           HPI:   Alex Haynes is a 53 year old male who presents for a complete physical exam.      Colonoscopy done 1/2022. Repeat in 7 years. In media.   Psa due. Prostate exam due.     Up to date tdap, shingrix.   Get covid booster at his local pharmacy.     Exercise: walk on treadmill 3-4 miles 3-4 times a week.     Sees cardiology   On atorvastatin for cholesterol, metoprolol for palpitations.     Had a tongue lesion. Was to see ent. He has not seen them in 2 years. Per pt he saw them a couple of years ago and was asked to follow up if it changed. It has not changed.     Has a hospital stay in 6/2023 for atypical chest pain. Cardiology notes reviewed.  He had a cardiac cath at that time, this was reviewed.   He then also had a heart scan, reviewed.     Wt Readings from Last 6 Encounters:   02/26/24 173 lb (78.5 kg)   01/09/23 176 lb 1.6 oz (79.9 kg)   03/30/22 176 lb (79.8 kg)   01/14/22 175 lb (79.4 kg)   12/14/21 177 lb (80.3 kg)   12/07/21 177 lb (80.3 kg)     Body mass index is 29.24 kg/m².       Current Outpatient Medications   Medication Sig Dispense Refill    atorvastatin 80 MG Oral Tab Take 0.5 tablets (40 mg total) by mouth daily.      ezetimibe 10 MG Oral Tab Take 1 tablet (10 mg total) by mouth daily.      aspirin 81 MG Oral Chew Tab Chew 1 tablet (81 mg total) by mouth daily.      pantoprazole 40 MG Oral Tab EC Take 1 tablet (40 mg total) by mouth daily.      Vitamin D3, Cholecalciferol, 25 MCG (1000 UT) Oral Tab Take 1 tablet (1,000 Units total) by mouth daily.      Fluticasone Propionate 50 MCG/ACT Nasal Suspension 2 sprays by Nasal route as needed for Allergies. As needed      metoprolol Tartrate 25 MG Oral Tab Take 1 tablet (25 mg total) by mouth 2 (two) times daily.      Multiple Vitamins-Minerals (CENTRUM OR) Take by mouth daily.        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 Specifically, no internal auditory canal abnormality    Nephrolithiasis     bilat    Overweight(278.02)     PAC (premature atrial contraction)     Thyroid dysfunction       Past Surgical History:   Procedure Laterality Date    OTHER SURGICAL HISTORY  1991    hair transplant    OTHER SURGICAL HISTORY  2/15    lithotripsy    OTHER SURGICAL HISTORY  12/15    sinus surgery    UPPER GI ENDOSCOPY PERFORMED  10/15/15    antral gastritis    VASECTOMY  2011      Family History   Problem Relation Age of Onset    Other (MI,) Paternal Grandmother         70's    Other (CAD, s/p MI) Paternal Grandfather     Other (HTN,anemia) Maternal Grandmother     Other (knee pain) Father     Other (HTN,HLD,DM) Mother     Other (hypothyroidism) Mother     Other (DM,HTN) Paternal Uncle     Other (MI,) Paternal Uncle     Other (DM,HTN) Other     Other (CAD , s/p MI) Paternal Uncle       Social History:   Social History     Socioeconomic History    Marital status:    Occupational History    Occupation: business owner   Tobacco Use    Smoking status: Former    Smokeless tobacco: Former     Types: Chew    Tobacco comments:     quit 2008 chew tobacco    Vaping Use    Vaping Use: Never used   Substance and Sexual Activity    Alcohol use: Yes     Comment: approx 5-6 beers per week    Drug use: No   Other Topics Concern    Caffeine Concern Yes      Comment: 1 green tea daily.  Regular tea 2 x per week. 1 soda per week    Exercise No    Seat Belt Yes     : yes.   Exercise: walking.  Diet: watches calories closely     REVIEW OF SYSTEMS:   GENERAL: feels well otherwise  SKIN: denies any unusual skin lesions  EYES:denies blurred vision or double vision  HEENT: denies nasal congestion, sinus pain or ST  LUNGS: denies shortness of breath with exertion  CARDIOVASCULAR: denies chest pain on exertion  GI: denies abdominal pain,denies heartburn  : denies dysuria  MUSCULOSKELETAL: denies back pain  NEURO: denies headaches  PSYCHE: denies depression or anxiety  HEMATOLOGIC: denies hx of anemia  ENDOCRINE: denies thyroid history  ALL/ASTHMA: denies hx of allergy or asthma  EXAM:   /70 (BP Location: Left arm, Patient Position: Sitting, Cuff Size: adult)   Pulse 68   Temp 97.9 °F (36.6 °C) (Temporal)   Resp 20   Ht 5' 4.5\" (1.638 m)   Wt 173 lb (78.5 kg)   SpO2 97%   BMI 29.24 kg/m²   Body mass index is 29.24 kg/m².   GENERAL: well developed, well nourished,in no apparent distress  SKIN: no rashes,no suspicious lesions  HEENT: atraumatic, normocephalic,ears and throat are clear  EYES:PERRLA, conjunctiva are clear  NECK: supple,no adenopathy,no bruits  CHEST: no chest tenderness  LUNGS: clear to auscultation  CARDIO: nl s1 and s2, RRR without murmur  GI: good BS's,no masses, HSM or tenderness  GENITAL/URINARY:  prostate mildly enlarged, no masses or tenderness  MUSCULOSKELETAL: back is not tender,FROM of the back  EXTREMITIES: no cyanosis, clubbing or edema  NEURO: Oriented times three,cranial nerves are intact,motor and sensory are grossly intact  Labs:   Lab Results   Component Value Date/Time    WBC 4.5 01/18/2023 10:16 AM    HGB 14.8 01/18/2023 10:16 AM     01/18/2023 10:16 AM      Lab Results   Component Value Date/Time     (H) 01/18/2023 10:16 AM     01/18/2023 10:16 AM    K 4.9 01/18/2023 10:16 AM     01/18/2023 10:16 AM     CO2 29 01/18/2023 10:16 AM    CREATSERUM 0.91 01/18/2023 10:16 AM    CA 9.7 01/18/2023 10:16 AM    ALB 4.4 01/18/2023 10:16 AM    TP 6.8 01/18/2023 10:16 AM    ALKPHO 68 01/18/2023 10:16 AM    AST 34 01/18/2023 10:16 AM    ALT 38 01/18/2023 10:16 AM    BILT 0.9 01/18/2023 10:16 AM    TSH 0.67 02/09/2021 12:00 AM    T4F 1.2 01/18/2023 10:16 AM        Lab Results   Component Value Date/Time    CHOLEST 103 (A) 10/10/2023 10:27 AM    HDL 34 (A) 10/10/2023 10:27 AM    TRIG 45 10/10/2023 10:27 AM    LDL 0 10/10/2023 10:27 AM    NONHDLC 86 01/18/2023 10:16 AM       Lab Results   Component Value Date/Time    A1C 6.0 (H) 01/18/2023 10:16 AM      Vitamin D:    Lab Results   Component Value Date    VITD 26 (L) 12/02/2020           ASSESSMENT AND PLAN:   Alex Haynes is a 53 year old male who presents for a complete physical exam.   1. Physical exam, annual  Do labs.   Psa ordered. Prostate exam done.   Immunizations discussed.   Continue regular exercise.   - Hepatic Function Panel (7) [E]  - CBC With Differential With Platelet  - TSH W Reflex To Free T4    2. Screening for prostate cancer  - PSA - Total [5363][Q]    3. Coronary artery disease involving native coronary artery of native heart without angina pectoris  follow up with cardiology.         Return in about 1 year (around 2/26/2025) for physical.      Edwina Cota MD

## 2024-02-26 ENCOUNTER — OFFICE VISIT (OUTPATIENT)
Dept: INTERNAL MEDICINE CLINIC | Facility: CLINIC | Age: 54
End: 2024-02-26
Payer: COMMERCIAL

## 2024-02-26 VITALS
TEMPERATURE: 98 F | DIASTOLIC BLOOD PRESSURE: 70 MMHG | BODY MASS INDEX: 29.18 KG/M2 | SYSTOLIC BLOOD PRESSURE: 110 MMHG | RESPIRATION RATE: 20 BRPM | HEART RATE: 68 BPM | HEIGHT: 64.5 IN | OXYGEN SATURATION: 97 % | WEIGHT: 173 LBS

## 2024-02-26 DIAGNOSIS — I25.10 CORONARY ARTERY DISEASE INVOLVING NATIVE CORONARY ARTERY OF NATIVE HEART WITHOUT ANGINA PECTORIS: ICD-10-CM

## 2024-02-26 DIAGNOSIS — Z00.00 PHYSICAL EXAM, ANNUAL: Primary | ICD-10-CM

## 2024-02-26 DIAGNOSIS — Z12.5 SCREENING FOR PROSTATE CANCER: ICD-10-CM

## 2024-02-26 PROCEDURE — 3008F BODY MASS INDEX DOCD: CPT | Performed by: INTERNAL MEDICINE

## 2024-02-26 PROCEDURE — 3074F SYST BP LT 130 MM HG: CPT | Performed by: INTERNAL MEDICINE

## 2024-02-26 PROCEDURE — 3078F DIAST BP <80 MM HG: CPT | Performed by: INTERNAL MEDICINE

## 2024-02-26 PROCEDURE — 99396 PREV VISIT EST AGE 40-64: CPT | Performed by: INTERNAL MEDICINE

## 2024-02-26 RX ORDER — EZETIMIBE 10 MG/1
10 TABLET ORAL DAILY
COMMUNITY

## 2024-02-26 RX ORDER — ASPIRIN 81 MG/1
81 TABLET, CHEWABLE ORAL DAILY
COMMUNITY
Start: 2023-06-16

## 2024-02-26 RX ORDER — ATORVASTATIN CALCIUM 80 MG/1
40 TABLET, FILM COATED ORAL DAILY
COMMUNITY
Start: 2023-12-05

## 2024-02-27 LAB
ABSOLUTE BASOPHILS: 30 CELLS/UL (ref 0–200)
ABSOLUTE EOSINOPHILS: 142 CELLS/UL (ref 15–500)
ABSOLUTE LYMPHOCYTES: 998 CELLS/UL (ref 850–3900)
ABSOLUTE MONOCYTES: 344 CELLS/UL (ref 200–950)
ABSOLUTE NEUTROPHILS: 2786 CELLS/UL (ref 1500–7800)
ALBUMIN/GLOBULIN RATIO: 1.8 (CALC) (ref 1–2.5)
ALBUMIN: 4.4 G/DL (ref 3.6–5.1)
ALKALINE PHOSPHATASE: 68 U/L (ref 35–144)
ALT: 34 U/L (ref 9–46)
AST: 34 U/L (ref 10–35)
BASOPHILS: 0.7 %
BILIRUBIN, DIRECT: 0.2 MG/DL
BILIRUBIN, INDIRECT: 0.8 MG/DL (CALC) (ref 0.2–1.2)
BILIRUBIN, TOTAL: 1 MG/DL (ref 0.2–1.2)
EOSINOPHILS: 3.3 %
GLOBULIN: 2.4 G/DL (CALC) (ref 1.9–3.7)
HEMATOCRIT: 43.5 % (ref 38.5–50)
HEMOGLOBIN: 15 G/DL (ref 13.2–17.1)
LYMPHOCYTES: 23.2 %
MCH: 31.8 PG (ref 27–33)
MCHC: 34.5 G/DL (ref 32–36)
MCV: 92.4 FL (ref 80–100)
MONOCYTES: 8 %
MPV: 11.6 FL (ref 7.5–12.5)
NEUTROPHILS: 64.8 %
PLATELET COUNT: 147 THOUSAND/UL (ref 140–400)
PROTEIN, TOTAL: 6.8 G/DL (ref 6.1–8.1)
PSA, TOTAL: 0.57 NG/ML
RDW: 12.3 % (ref 11–15)
RED BLOOD CELL COUNT: 4.71 MILLION/UL (ref 4.2–5.8)
TSH W/REFLEX TO FT4: 0.41 MIU/L (ref 0.4–4.5)
WHITE BLOOD CELL COUNT: 4.3 THOUSAND/UL (ref 3.8–10.8)

## 2025-01-08 ENCOUNTER — TELEPHONE (OUTPATIENT)
Dept: INTERNAL MEDICINE CLINIC | Facility: CLINIC | Age: 55
End: 2025-01-08

## 2025-01-08 NOTE — TELEPHONE ENCOUNTER
Incoming (mail or fax):  fax  Received from:  [facility]  Documentation given to:  dr christiansen       Home health exam

## 2025-01-24 ENCOUNTER — MED REC SCAN ONLY (OUTPATIENT)
Dept: INTERNAL MEDICINE CLINIC | Facility: CLINIC | Age: 55
End: 2025-01-24

## 2025-02-04 NOTE — PROGRESS NOTES
[facility]    CHIEF COMPLAINT:   Chief Complaint   Patient presents with    Routine Physical     2/27/24-psa- 1/26/22 colon yrs repeat. Reviewed Preventative/Wellness form with patient.           HPI:   Alex Haynes is a 54 year old male who presents for a complete physical exam.      Psa due. No change in urinary symptoms.   Colonoscopy done 1/2022, repeat in 7 years. In media.     Up to date tdap, shingrix.   Due prevnar 20. Done today.   No covid booster. Declines.   Declines flu shot.     Exercise: walking    Derm: no changing moles or skin concerns.     Sees cardiology for cad.   On atorvastatin for cholesterol, metoprolol for palpitations.     On pantoprazole 40mg daily for a few years now. Has not seen gi. Urged to follow up with them.     Wt Readings from Last 6 Encounters:   02/05/25 171 lb 6.4 oz (77.7 kg)   02/26/24 173 lb (78.5 kg)   01/09/23 176 lb 1.6 oz (79.9 kg)   03/30/22 176 lb (79.8 kg)   01/14/22 175 lb (79.4 kg)   12/14/21 177 lb (80.3 kg)     Body mass index is 28.91 kg/m².       Current Outpatient Medications   Medication Sig Dispense Refill    atorvastatin 80 MG Oral Tab Take 0.5 tablets (40 mg total) by mouth daily.      ezetimibe 10 MG Oral Tab Take 1 tablet (10 mg total) by mouth daily.      aspirin 81 MG Oral Chew Tab Chew 1 tablet (81 mg total) by mouth daily.      pantoprazole 40 MG Oral Tab EC Take 1 tablet (40 mg total) by mouth daily.      Vitamin D3, Cholecalciferol, 25 MCG (1000 UT) Oral Tab Take 1 tablet (1,000 Units total) by mouth daily.      metoprolol Tartrate 25 MG Oral Tab Take 1 tablet (25 mg total) by mouth 2 (two) times daily.      Multiple Vitamins-Minerals (CENTRUM OR) Take by mouth daily.        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 Specifically, no internal auditory canal abnormality    Nephrolithiasis    bilat    Overweight(278.02)    PAC (premature atrial contraction)    Thyroid dysfunction      Past Surgical History:   Procedure Laterality Date    Other surgical history  1991    hair transplant    Other surgical history  2/15    lithotripsy    Other surgical history  12/15    sinus surgery    Upper gi endoscopy performed  10/15/15    antral gastritis    Vasectomy  2011      Family History   Problem Relation Age of Onset    Other (MI,) Paternal Grandmother         70's    Other (CAD, s/p MI) Paternal Grandfather     Other (HTN,anemia) Maternal Grandmother     Other (knee pain) Father     Other (HTN,HLD,DM) Mother     Other (hypothyroidism) Mother     Other (DM,HTN) Paternal Uncle     Other (MI,) Paternal Uncle     Other (DM,HTN) Other     Other (CAD , s/p MI) Paternal Uncle       Social History:   Social History     Socioeconomic History    Marital status:    Occupational History    Occupation: business owner   Tobacco Use    Smoking status: Former     Passive exposure: Past    Smokeless tobacco: Former     Types: Chew    Tobacco comments:     quit 2008 chew tobacco    Vaping Use    Vaping status: Never Used   Substance and Sexual Activity    Alcohol use: Yes     Comment: approx 5-6 beers per week    Drug use: No   Other Topics Concern    Caffeine Concern Yes     Comment: 1 green tea daily.  Regular tea 2 x per week. 1 soda per week    Exercise No    Seat Belt Yes     : yes.    Exercise: walking.  Diet: watches calories closely     REVIEW OF SYSTEMS:   GENERAL: feels well otherwise  SKIN: denies any unusual skin lesions  EYES:denies blurred vision  or double vision  HEENT: denies nasal congestion, sinus pain or ST  LUNGS: denies shortness of breath with exertion  CARDIOVASCULAR: denies chest pain on exertion  GI: denies abdominal pain,denies heartburn  : denies dysuria  MUSCULOSKELETAL: denies back pain  NEURO: denies headaches  PSYCHE: denies depression or anxiety  HEMATOLOGIC: denies hx of anemia  ENDOCRINE: denies thyroid history  ALL/ASTHMA: denies hx of allergy or asthma  EXAM:   /66 (BP Location: Left arm, Patient Position: Sitting, Cuff Size: adult)   Pulse 67   Temp 97.8 °F (36.6 °C) (Temporal)   Resp 20   Ht 5' 4.57\" (1.64 m)   Wt 171 lb 6.4 oz (77.7 kg)   SpO2 100%   BMI 28.91 kg/m²   Body mass index is 28.91 kg/m².   GENERAL: well developed, well nourished,in no apparent distress  SKIN: no rashes,no suspicious lesions  HEENT: atraumatic, normocephalic,ears and throat are clear  EYES:PERRLA, conjunctiva are clear  NECK: supple,no adenopathy,no bruits  CHEST: no chest tenderness  LUNGS: clear to auscultation  CARDIO: nl s1 and s2, RRR without murmur  GI: good BS's,no masses, HSM or tenderness  GENITAL/URINARY:  deferred. No symptoms.   MUSCULOSKELETAL: back is not tender,FROM of the back  EXTREMITIES: no cyanosis, clubbing or edema  NEURO: Oriented times three,cranial nerves are intact,motor and sensory are grossly intact  Labs:   Lab Results   Component Value Date/Time    WBC 4.3 02/26/2024 10:45 AM    HGB 15.0 02/26/2024 10:45 AM     02/26/2024 10:45 AM      Lab Results   Component Value Date/Time     (H) 01/18/2023 10:16 AM     01/18/2023 10:16 AM    K 4.9 01/18/2023 10:16 AM     01/18/2023 10:16 AM    CO2 29 01/18/2023 10:16 AM    CREATSERUM 0.91 01/18/2023 10:16 AM    CA 9.7 01/18/2023 10:16 AM    ALB 4.4 02/26/2024 10:45 AM    TP 6.8 02/26/2024 10:45 AM    ALKPHO 68 02/26/2024 10:45 AM    AST 34 02/26/2024 10:45 AM    ALT 34 02/26/2024 10:45 AM    BILT 1.0 02/26/2024 10:45 AM    TSH 0.67 02/09/2021 12:00  AM    T4F 1.2 01/18/2023 10:16 AM        Lab Results   Component Value Date/Time    CHOLEST 103 (A) 10/10/2023 10:27 AM    HDL 34 (A) 10/10/2023 10:27 AM    TRIG 45 10/10/2023 10:27 AM    LDL 0 10/10/2023 10:27 AM    NONHDLC 86 01/18/2023 10:16 AM       Lab Results   Component Value Date/Time    A1C 6.0 (H) 01/18/2023 10:16 AM      Vitamin D:    Lab Results   Component Value Date    VITD 26 (L) 12/02/2020           ASSESSMENT AND PLAN:   Alex Haynes is a 54 year old male who presents for a complete physical exam.   1. Physical exam, annual  Do labs.   Lipid panel done by cardiology.   Immunizations discussed.   HM discussed.   Continue regular exercise.   - CBC With Differential With Platelet  - Comp Metabolic Panel  - Hemoglobin A1C  - TSH W Reflex To Free T4    2. Screening for prostate cancer  - PSA - Total [5363][Q]    3. Screening for endocrine, metabolic and immunity disorder  - Hemoglobin A1C        Return in about 1 year (around 2/5/2026) for physical.      Edwina Cota MD

## 2025-02-05 ENCOUNTER — OFFICE VISIT (OUTPATIENT)
Dept: INTERNAL MEDICINE CLINIC | Facility: CLINIC | Age: 55
End: 2025-02-05
Payer: COMMERCIAL

## 2025-02-05 VITALS
TEMPERATURE: 98 F | HEIGHT: 64.57 IN | OXYGEN SATURATION: 100 % | HEART RATE: 67 BPM | BODY MASS INDEX: 28.9 KG/M2 | RESPIRATION RATE: 20 BRPM | DIASTOLIC BLOOD PRESSURE: 66 MMHG | WEIGHT: 171.38 LBS | SYSTOLIC BLOOD PRESSURE: 120 MMHG

## 2025-02-05 DIAGNOSIS — Z12.5 SCREENING FOR PROSTATE CANCER: ICD-10-CM

## 2025-02-05 DIAGNOSIS — Z13.228 SCREENING FOR ENDOCRINE, METABOLIC AND IMMUNITY DISORDER: ICD-10-CM

## 2025-02-05 DIAGNOSIS — Z13.0 SCREENING FOR ENDOCRINE, METABOLIC AND IMMUNITY DISORDER: ICD-10-CM

## 2025-02-05 DIAGNOSIS — Z13.29 SCREENING FOR ENDOCRINE, METABOLIC AND IMMUNITY DISORDER: ICD-10-CM

## 2025-02-05 DIAGNOSIS — Z23 NEED FOR VACCINATION: ICD-10-CM

## 2025-02-05 DIAGNOSIS — Z00.00 PHYSICAL EXAM, ANNUAL: Primary | ICD-10-CM

## 2025-02-05 PROBLEM — R07.9 CHEST PAIN: Status: ACTIVE | Noted: 2025-02-05

## 2025-02-05 PROBLEM — I25.10 CORONARY ARTERY DISEASE INVOLVING NATIVE CORONARY ARTERY OF NATIVE HEART WITHOUT ANGINA PECTORIS: Status: ACTIVE | Noted: 2025-02-05

## 2025-02-05 PROBLEM — I20.0 INTERMEDIATE CORONARY SYNDROME (HCC): Status: ACTIVE | Noted: 2025-02-05

## 2025-02-18 LAB
ABSOLUTE BASOPHILS: 42 CELLS/UL (ref 0–200)
ABSOLUTE EOSINOPHILS: 398 CELLS/UL (ref 15–500)
ABSOLUTE LYMPHOCYTES: 1228 CELLS/UL (ref 850–3900)
ABSOLUTE MONOCYTES: 664 CELLS/UL (ref 200–950)
ABSOLUTE NEUTROPHILS: 5968 CELLS/UL (ref 1500–7800)
ALBUMIN/GLOBULIN RATIO: 2 (CALC) (ref 1–2.5)
ALBUMIN: 4.4 G/DL (ref 3.6–5.1)
ALKALINE PHOSPHATASE: 77 U/L (ref 35–144)
ALT: 26 U/L (ref 9–46)
AST: 25 U/L (ref 10–35)
BASOPHILS: 0.5 %
BILIRUBIN, TOTAL: 0.8 MG/DL (ref 0.2–1.2)
BUN: 9 MG/DL (ref 7–25)
CALCIUM: 9.5 MG/DL (ref 8.6–10.3)
CARBON DIOXIDE: 29 MMOL/L (ref 20–32)
CHLORIDE: 105 MMOL/L (ref 98–110)
CREATININE: 0.83 MG/DL (ref 0.7–1.3)
EGFR: 103 ML/MIN/1.73M2
EOSINOPHILS: 4.8 %
GLOBULIN: 2.2 G/DL (CALC) (ref 1.9–3.7)
GLUCOSE: 119 MG/DL (ref 65–99)
HEMATOCRIT: 42.5 % (ref 38.5–50)
HEMOGLOBIN A1C: 6.6 % OF TOTAL HGB
HEMOGLOBIN: 14.1 G/DL (ref 13.2–17.1)
LYMPHOCYTES: 14.8 %
MCH: 28.9 PG (ref 27–33)
MCHC: 33.2 G/DL (ref 32–36)
MCV: 87.1 FL (ref 80–100)
MONOCYTES: 8 %
MPV: 10.6 FL (ref 7.5–12.5)
NEUTROPHILS: 71.9 %
PLATELET COUNT: 153 THOUSAND/UL (ref 140–400)
POTASSIUM: 4.8 MMOL/L (ref 3.5–5.3)
PROTEIN, TOTAL: 6.6 G/DL (ref 6.1–8.1)
PSA, TOTAL: 0.61 NG/ML
RDW: 14.1 % (ref 11–15)
RED BLOOD CELL COUNT: 4.88 MILLION/UL (ref 4.2–5.8)
SODIUM: 141 MMOL/L (ref 135–146)
TSH W/REFLEX TO FT4: 0.97 MIU/L (ref 0.4–4.5)
WHITE BLOOD CELL COUNT: 8.3 THOUSAND/UL (ref 3.8–10.8)

## 2025-02-19 DIAGNOSIS — R73.09 ELEVATED HEMOGLOBIN A1C: Primary | ICD-10-CM

## 2025-08-21 ENCOUNTER — OFFICE VISIT (OUTPATIENT)
Dept: INTERNAL MEDICINE CLINIC | Facility: CLINIC | Age: 55
End: 2025-08-21

## 2025-08-21 VITALS
BODY MASS INDEX: 29.53 KG/M2 | HEIGHT: 64 IN | OXYGEN SATURATION: 99 % | HEART RATE: 88 BPM | TEMPERATURE: 98 F | SYSTOLIC BLOOD PRESSURE: 110 MMHG | RESPIRATION RATE: 20 BRPM | WEIGHT: 173 LBS | DIASTOLIC BLOOD PRESSURE: 66 MMHG

## 2025-08-21 DIAGNOSIS — E11.9 DIET-CONTROLLED DIABETES MELLITUS (HCC): ICD-10-CM

## 2025-08-21 DIAGNOSIS — K21.9 GASTROESOPHAGEAL REFLUX DISEASE, UNSPECIFIED WHETHER ESOPHAGITIS PRESENT: ICD-10-CM

## 2025-08-21 DIAGNOSIS — R09.81 CONGESTION OF NASAL SINUS: Primary | ICD-10-CM

## 2025-08-21 DIAGNOSIS — J32.1 CHRONIC FRONTAL SINUSITIS: ICD-10-CM

## 2025-08-21 DIAGNOSIS — K29.50 ANTRAL GASTRITIS: ICD-10-CM

## 2025-08-21 PROBLEM — K21.00 GASTROESOPHAGEAL REFLUX DISEASE WITH ESOPHAGITIS WITHOUT HEMORRHAGE: Status: ACTIVE | Noted: 2025-08-21

## 2025-08-21 PROCEDURE — 3078F DIAST BP <80 MM HG: CPT | Performed by: STUDENT IN AN ORGANIZED HEALTH CARE EDUCATION/TRAINING PROGRAM

## 2025-08-21 PROCEDURE — 3008F BODY MASS INDEX DOCD: CPT | Performed by: STUDENT IN AN ORGANIZED HEALTH CARE EDUCATION/TRAINING PROGRAM

## 2025-08-21 PROCEDURE — 3044F HG A1C LEVEL LT 7.0%: CPT | Performed by: STUDENT IN AN ORGANIZED HEALTH CARE EDUCATION/TRAINING PROGRAM

## 2025-08-21 PROCEDURE — G2211 COMPLEX E/M VISIT ADD ON: HCPCS | Performed by: STUDENT IN AN ORGANIZED HEALTH CARE EDUCATION/TRAINING PROGRAM

## 2025-08-21 PROCEDURE — 3074F SYST BP LT 130 MM HG: CPT | Performed by: STUDENT IN AN ORGANIZED HEALTH CARE EDUCATION/TRAINING PROGRAM

## 2025-08-21 PROCEDURE — 99214 OFFICE O/P EST MOD 30 MIN: CPT | Performed by: STUDENT IN AN ORGANIZED HEALTH CARE EDUCATION/TRAINING PROGRAM

## 2025-08-22 LAB — HEMOGLOBIN A1C: 6.4 %

## (undated) NOTE — MR AVS SNAPSHOT
[address] [address] [phone]               Thank you for choosing us for your health care visit with Roshan Collins MD.  We are glad to serve you and happy to provide you with to obtain this authorization for your ordered radiology test.    To schedule an appointment for your radiology test please call [facility] Scheduling   at [phone].        Wednesday June 14, 2017     Cardiology:  CARD MONITOR HOLTER 48 HOUR Avoid over sized portions. Don’t eat while when you’re bored.      EAT THESE FOODS MORE OFTEN: EAT THESE FOODS LESS OFTEN:   Make half your plate fruits and vegetables Highly refined, white starches including white bread, rice and pasta   Eat plenty of pr